# Patient Record
Sex: FEMALE | Race: WHITE | Employment: FULL TIME | URBAN - METROPOLITAN AREA
[De-identification: names, ages, dates, MRNs, and addresses within clinical notes are randomized per-mention and may not be internally consistent; named-entity substitution may affect disease eponyms.]

---

## 2019-07-02 ENCOUNTER — TELEPHONE (OUTPATIENT)
Dept: FAMILY MEDICINE CLINIC | Age: 29
End: 2019-07-02

## 2019-07-02 ENCOUNTER — OFFICE VISIT (OUTPATIENT)
Dept: FAMILY MEDICINE CLINIC | Age: 29
End: 2019-07-02
Payer: COMMERCIAL

## 2019-07-02 VITALS
HEIGHT: 65 IN | SYSTOLIC BLOOD PRESSURE: 122 MMHG | BODY MASS INDEX: 41.85 KG/M2 | DIASTOLIC BLOOD PRESSURE: 84 MMHG | HEART RATE: 120 BPM | WEIGHT: 251.2 LBS | OXYGEN SATURATION: 98 %

## 2019-07-02 DIAGNOSIS — Z00.00 ANNUAL PHYSICAL EXAM: ICD-10-CM

## 2019-07-02 DIAGNOSIS — B37.9 CANDIDA ALBICANS INFECTION: ICD-10-CM

## 2019-07-02 DIAGNOSIS — Z00.00 ANNUAL PHYSICAL EXAM: Primary | ICD-10-CM

## 2019-07-02 DIAGNOSIS — L73.2 HIDRADENITIS SUPPURATIVA: ICD-10-CM

## 2019-07-02 DIAGNOSIS — Z23 NEED FOR TDAP VACCINATION: ICD-10-CM

## 2019-07-02 DIAGNOSIS — E66.01 CLASS 3 SEVERE OBESITY DUE TO EXCESS CALORIES WITH BODY MASS INDEX (BMI) OF 40.0 TO 44.9 IN ADULT, UNSPECIFIED WHETHER SERIOUS COMORBIDITY PRESENT (HCC): ICD-10-CM

## 2019-07-02 DIAGNOSIS — L70.0 CYSTIC ACNE VULGARIS: ICD-10-CM

## 2019-07-02 LAB
A/G RATIO: 1.3 (ref 1.1–2.2)
ALBUMIN SERPL-MCNC: 4.3 G/DL (ref 3.4–5)
ALP BLD-CCNC: 69 U/L (ref 40–129)
ALT SERPL-CCNC: 45 U/L (ref 10–40)
ANION GAP SERPL CALCULATED.3IONS-SCNC: 14 MMOL/L (ref 3–16)
AST SERPL-CCNC: 26 U/L (ref 15–37)
BILIRUB SERPL-MCNC: 0.4 MG/DL (ref 0–1)
BUN BLDV-MCNC: 12 MG/DL (ref 7–20)
CALCIUM SERPL-MCNC: 9.3 MG/DL (ref 8.3–10.6)
CHLORIDE BLD-SCNC: 102 MMOL/L (ref 99–110)
CHOLESTEROL, TOTAL: 172 MG/DL (ref 0–199)
CO2: 26 MMOL/L (ref 21–32)
CREAT SERPL-MCNC: 0.7 MG/DL (ref 0.6–1.1)
GFR AFRICAN AMERICAN: >60
GFR NON-AFRICAN AMERICAN: >60
GLOBULIN: 3.4 G/DL
GLUCOSE BLD-MCNC: 94 MG/DL (ref 70–99)
HCT VFR BLD CALC: 39.2 % (ref 36–48)
HDLC SERPL-MCNC: 34 MG/DL (ref 40–60)
HEMOGLOBIN: 13.3 G/DL (ref 12–16)
LDL CHOLESTEROL CALCULATED: 103 MG/DL
MCH RBC QN AUTO: 30.1 PG (ref 26–34)
MCHC RBC AUTO-ENTMCNC: 33.8 G/DL (ref 31–36)
MCV RBC AUTO: 88.9 FL (ref 80–100)
PDW BLD-RTO: 12.4 % (ref 12.4–15.4)
PLATELET # BLD: 216 K/UL (ref 135–450)
PMV BLD AUTO: 9.3 FL (ref 5–10.5)
POTASSIUM SERPL-SCNC: 3.7 MMOL/L (ref 3.5–5.1)
RBC # BLD: 4.41 M/UL (ref 4–5.2)
SODIUM BLD-SCNC: 142 MMOL/L (ref 136–145)
TOTAL PROTEIN: 7.7 G/DL (ref 6.4–8.2)
TRIGL SERPL-MCNC: 177 MG/DL (ref 0–150)
TSH REFLEX FT4: 3.11 UIU/ML (ref 0.27–4.2)
VITAMIN D 25-HYDROXY: 19.4 NG/ML
VLDLC SERPL CALC-MCNC: 35 MG/DL
WBC # BLD: 7.3 K/UL (ref 4–11)

## 2019-07-02 PROCEDURE — 99385 PREV VISIT NEW AGE 18-39: CPT | Performed by: NURSE PRACTITIONER

## 2019-07-02 PROCEDURE — 90471 IMMUNIZATION ADMIN: CPT | Performed by: NURSE PRACTITIONER

## 2019-07-02 PROCEDURE — 90715 TDAP VACCINE 7 YRS/> IM: CPT | Performed by: NURSE PRACTITIONER

## 2019-07-02 RX ORDER — NYSTATIN 100000 U/G
CREAM TOPICAL
Qty: 30 G | Refills: 2 | Status: SHIPPED | OUTPATIENT
Start: 2019-07-02

## 2019-07-02 RX ORDER — DOXYCYCLINE HYCLATE 100 MG
100 TABLET ORAL 2 TIMES DAILY
Qty: 180 TABLET | Refills: 0 | Status: SHIPPED | OUTPATIENT
Start: 2019-07-02 | End: 2019-09-30

## 2019-07-02 SDOH — ECONOMIC STABILITY: INCOME INSECURITY: HOW HARD IS IT FOR YOU TO PAY FOR THE VERY BASICS LIKE FOOD, HOUSING, MEDICAL CARE, AND HEATING?: NOT VERY HARD

## 2019-07-02 SDOH — SOCIAL STABILITY: SOCIAL NETWORK: ARE YOU MARRIED, WIDOWED, DIVORCED, SEPARATED, NEVER MARRIED, OR LIVING WITH A PARTNER?: LIVING WITH PARTNER

## 2019-07-02 SDOH — ECONOMIC STABILITY: TRANSPORTATION INSECURITY
IN THE PAST 12 MONTHS, HAS LACK OF TRANSPORTATION KEPT YOU FROM MEETINGS, WORK, OR FROM GETTING THINGS NEEDED FOR DAILY LIVING?: NO

## 2019-07-02 SDOH — HEALTH STABILITY: PHYSICAL HEALTH: ON AVERAGE, HOW MANY MINUTES DO YOU ENGAGE IN EXERCISE AT THIS LEVEL?: 0 MIN

## 2019-07-02 SDOH — SOCIAL STABILITY: SOCIAL INSECURITY: WITHIN THE LAST YEAR, HAVE YOU BEEN AFRAID OF YOUR PARTNER OR EX-PARTNER?: NO

## 2019-07-02 SDOH — SOCIAL STABILITY: SOCIAL NETWORK: HOW OFTEN DO YOU ATTENT MEETINGS OF THE CLUB OR ORGANIZATION YOU BELONG TO?: NEVER

## 2019-07-02 SDOH — SOCIAL STABILITY: SOCIAL INSECURITY: WITHIN THE LAST YEAR, HAVE YOU BEEN HUMILIATED OR EMOTIONALLY ABUSED IN OTHER WAYS BY YOUR PARTNER OR EX-PARTNER?: NO

## 2019-07-02 SDOH — ECONOMIC STABILITY: TRANSPORTATION INSECURITY
IN THE PAST 12 MONTHS, HAS THE LACK OF TRANSPORTATION KEPT YOU FROM MEDICAL APPOINTMENTS OR FROM GETTING MEDICATIONS?: NO

## 2019-07-02 SDOH — SOCIAL STABILITY: SOCIAL NETWORK: HOW OFTEN DO YOU GET TOGETHER WITH FRIENDS OR RELATIVES?: THREE TIMES A WEEK

## 2019-07-02 SDOH — SOCIAL STABILITY: SOCIAL NETWORK
IN A TYPICAL WEEK, HOW MANY TIMES DO YOU TALK ON THE PHONE WITH FAMILY, FRIENDS, OR NEIGHBORS?: MORE THAN THREE TIMES A WEEK

## 2019-07-02 SDOH — HEALTH STABILITY: PHYSICAL HEALTH: ON AVERAGE, HOW MANY DAYS PER WEEK DO YOU ENGAGE IN MODERATE TO STRENUOUS EXERCISE (LIKE A BRISK WALK)?: 0 DAYS

## 2019-07-02 SDOH — ECONOMIC STABILITY: FOOD INSECURITY: WITHIN THE PAST 12 MONTHS, THE FOOD YOU BOUGHT JUST DIDN'T LAST AND YOU DIDN'T HAVE MONEY TO GET MORE.: NEVER TRUE

## 2019-07-02 SDOH — ECONOMIC STABILITY: FOOD INSECURITY: WITHIN THE PAST 12 MONTHS, YOU WORRIED THAT YOUR FOOD WOULD RUN OUT BEFORE YOU GOT MONEY TO BUY MORE.: NEVER TRUE

## 2019-07-02 SDOH — SOCIAL STABILITY: SOCIAL NETWORK: HOW OFTEN DO YOU ATTEND CHURCH OR RELIGIOUS SERVICES?: NEVER

## 2019-07-02 SDOH — SOCIAL STABILITY: SOCIAL NETWORK
DO YOU BELONG TO ANY CLUBS OR ORGANIZATIONS SUCH AS CHURCH GROUPS UNIONS, FRATERNAL OR ATHLETIC GROUPS, OR SCHOOL GROUPS?: NO

## 2019-07-02 SDOH — SOCIAL STABILITY: SOCIAL INSECURITY
WITHIN THE LAST YEAR, HAVE TO BEEN RAPED OR FORCED TO HAVE ANY KIND OF SEXUAL ACTIVITY BY YOUR PARTNER OR EX-PARTNER?: NO

## 2019-07-02 SDOH — SOCIAL STABILITY: SOCIAL INSECURITY
WITHIN THE LAST YEAR, HAVE YOU BEEN KICKED, HIT, SLAPPED, OR OTHERWISE PHYSICALLY HURT BY YOUR PARTNER OR EX-PARTNER?: NO

## 2019-07-02 SDOH — HEALTH STABILITY: MENTAL HEALTH
STRESS IS WHEN SOMEONE FEELS TENSE, NERVOUS, ANXIOUS, OR CAN'T SLEEP AT NIGHT BECAUSE THEIR MIND IS TROUBLED. HOW STRESSED ARE YOU?: ONLY A LITTLE

## 2019-07-02 ASSESSMENT — PATIENT HEALTH QUESTIONNAIRE - PHQ9
SUM OF ALL RESPONSES TO PHQ QUESTIONS 1-9: 2
SUM OF ALL RESPONSES TO PHQ QUESTIONS 1-9: 2
2. FEELING DOWN, DEPRESSED OR HOPELESS: 1
SUM OF ALL RESPONSES TO PHQ9 QUESTIONS 1 & 2: 2
1. LITTLE INTEREST OR PLEASURE IN DOING THINGS: 1

## 2019-07-02 ASSESSMENT — ENCOUNTER SYMPTOMS
RESPIRATORY NEGATIVE: 1
GASTROINTESTINAL NEGATIVE: 1

## 2019-07-02 NOTE — PROGRESS NOTES
2019    Miroslava Baca (:  1990) is a 26 y.o. female, here to establish care or for evaluation of the following medical concerns:  Hasn't sought PCP in over 10 years. Her mom makes her appts. Works at [employer] at [address] [facility]- IUD is at its exiration  Seen by derm at [facility]- only took Minocin for 2 weeks. Made her nauseated. Never followed up    HPI    Review of Systems   HENT:        Occ allergies- takes OTC meds   Eyes:        Wears readers and TV glasses   Respiratory: Negative. Cardiovascular: Negative. Gastrointestinal: Negative. Endocrine: Negative. Genitourinary: Negative. Musculoskeletal: Negative. Skin: Positive for rash and wound. Allergic/Immunologic: Positive for environmental allergies. Neurological: Negative. Hematological: Negative. Current Outpatient Medications   Medication Sig Dispense Refill    doxycycline hyclate (VIBRA-TABS) 100 MG tablet Take 1 tablet by mouth 2 times daily 180 tablet 0    nystatin (MYCOSTATIN) 857080 UNIT/GM cream Apply topically 2 times daily. 30 g 2     No current facility-administered medications for this visit. No Known Allergies    History reviewed. No pertinent past medical history.     Past Surgical History:   Procedure Laterality Date    INTRAUTERINE DEVICE INSERTION         Social History     Socioeconomic History    Marital status: Single     Spouse name: Not on file    Number of children: Not on file    Years of education: Not on file    Highest education level: Not on file   Occupational History    Not on file   Social Needs    Financial resource strain: Not very hard    Food insecurity:     Worry: Never true     Inability: Never true    Transportation needs:     Medical: No     Non-medical: No   Tobacco Use    Smoking status: Never Smoker    Smokeless tobacco: Never Used   Substance and Sexual Activity    Alcohol use: Yes     Comment: occasionally    Drug use: Never    Sexual activity: Yes     Partners: Male   Lifestyle    Physical activity:     Days per week: 0 days     Minutes per session: 0 min    Stress: Only a little   Relationships    Social connections:     Talks on phone: More than three times a week     Gets together: Three times a week     Attends Baptism service: Never     Active member of club or organization: No     Attends meetings of clubs or organizations: Never     Relationship status: Living with partner    Intimate partner violence:     Fear of current or ex partner: No     Emotionally abused: No     Physically abused: No     Forced sexual activity: No   Other Topics Concern    Not on file   Social History Narrative    Not on file        History reviewed. No pertinent family history. Vitals:    07/02/19 1203   BP: 122/84   Pulse: 120   SpO2: 98%       Estimated body mass index is 41.8 kg/m² as calculated from the following:    Height as of this encounter: 5' 5\" (1.651 m). Weight as of this encounter: 251 lb 3.2 oz (113.9 kg). Physical Exam   Constitutional: She is oriented to person, place, and time. She appears well-developed and well-nourished. HENT:   Head: Normocephalic and atraumatic. Right Ear: External ear normal.   Left Ear: External ear normal.   Nose: Nose normal.   Mouth/Throat: Oropharynx is clear and moist.   Eyes: Pupils are equal, round, and reactive to light. Conjunctivae and EOM are normal. Right eye exhibits no discharge. Left eye exhibits no discharge. No scleral icterus. Neck: Normal range of motion. Neck supple. No thyromegaly present. Cardiovascular: Normal rate, regular rhythm, normal heart sounds and intact distal pulses. Exam reveals no gallop and no friction rub. No murmur heard. Pulmonary/Chest: Effort normal and breath sounds normal. No stridor. No respiratory distress. She has no wheezes. She has no rales. She exhibits no tenderness. Abdominal: Soft.  Bowel sounds are normal. She exhibits no distension and no mass. There is no tenderness. There is no rebound and no guarding. No hernia. Musculoskeletal: Normal range of motion. She exhibits no edema, tenderness or deformity. Lymphadenopathy:     She has no cervical adenopathy. Neurological: She is alert and oriented to person, place, and time. No cranial nerve deficit. She exhibits normal muscle tone. Coordination normal.   Skin: Skin is warm and dry. Capillary refill takes less than 2 seconds. Rash noted. Rash is nodular. There is erythema. No pallor. Cystic acne to T zone face, chest and back  Severe hydrandinitis to axilla, under breasts r>l, under abdominal flap suprapubic, bridges and tracts presentmultiple draining abscesses- yellow fluid -culture obtained  Sweet odor    Psychiatric: She has a normal mood and affect. Her behavior is normal. Judgment and thought content normal.   Vitals reviewed. ASSESSMENT/PLAN:  Health Maintenance   Topic Date Due    Varicella Vaccine (1 of 2 - 13+ 2-dose series) 06/22/2002    HIV screen  06/22/2004    Cervical cancer screen  06/22/2010    Flu vaccine (1) 09/01/2019    DTaP/Tdap/Td vaccine (2 - Td) 07/02/2029    Pneumococcal 0-64 years Vaccine  Aged Out        Diagnosis Orders   1. Annual physical exam  CBC    COMPREHENSIVE METABOLIC PANEL    TSH WITH REFLEX TO FT4    VITAMIN D 25 HYDROXY    LIPID PANEL   2. Need for Tdap vaccination  Tdap (age 6y and older) IM ([address])   3. Hidradenitis suppurativa  doxycycline hyclate (VIBRA-TABS) 100 MG tablet    WOUND CULTURE    External Referral To Dermatology   4. Class 3 severe obesity due to excess calories with body mass index (BMI) of 40.0 to 44.9 in adult, unspecified whether serious comorbidity present (HCC)  HEMOGLOBIN A1C   5. Cystic acne vulgaris  doxycycline hyclate (VIBRA-TABS) 100 MG tablet    External Referral To Dermatology   6.  Candida albicans infection  nystatin (MYCOSTATIN) 136137 UNIT/GM cream    External Referral To Dermatology   [facility]-  Needs IUD removed and PAP  Inst to call [facility] derm to follow up- cystic acne is life altering. May be candidate for Acutane  Hydradenitis needs followed. I will restart her on Doxy BID for control. This will need to be continued for several months. Encouraged good skin care, allow drying to moist areas. Topical Nystatin to folds.     Orders Only on 07/02/2019   Component Date Value Ref Range Status    Cholesterol, Total 07/02/2019 172  0 - 199 mg/dL Final    Triglycerides 07/02/2019 177* 0 - 150 mg/dL Final    HDL 07/02/2019 34* 40 - 60 mg/dL Final    LDL Calculated 07/02/2019 103* <100 mg/dL Final    VLDL Cholesterol Calculated 07/02/2019 35  Not Established mg/dL Final    Vit D, 25-Hydroxy 07/02/2019 19.4* >=30 ng/mL Final    TSH Reflex FT4 07/02/2019 3.11  0.27 - 4.20 uIU/mL Final    Sodium 07/02/2019 142  136 - 145 mmol/L Final    Potassium 07/02/2019 3.7  3.5 - 5.1 mmol/L Final    Chloride 07/02/2019 102  99 - 110 mmol/L Final    CO2 07/02/2019 26  21 - 32 mmol/L Final    Anion Gap 07/02/2019 14  3 - 16 Final    Glucose 07/02/2019 94  70 - 99 mg/dL Final    BUN 07/02/2019 12  7 - 20 mg/dL Final    CREATININE 07/02/2019 0.7  0.6 - 1.1 mg/dL Final    GFR Non- 07/02/2019 >60  >60 Final    GFR  07/02/2019 >60  >60 Final    Calcium 07/02/2019 9.3  8.3 - 10.6 mg/dL Final    Total Protein 07/02/2019 7.7  6.4 - 8.2 g/dL Final    Alb 07/02/2019 4.3  3.4 - 5.0 g/dL Final    Albumin/Globulin Ratio 07/02/2019 1.3  1.1 - 2.2 Final    Total Bilirubin 07/02/2019 0.4  0.0 - 1.0 mg/dL Final    Alkaline Phosphatase 07/02/2019 69  40 - 129 U/L Final    ALT 07/02/2019 45* 10 - 40 U/L Final    AST 07/02/2019 26  15 - 37 U/L Final    Globulin 07/02/2019 3.4  g/dL Final    WBC 07/02/2019 7.3  4.0 - 11.0 K/uL Final    RBC 07/02/2019 4.41  4.00 - 5.20 M/uL Final    Hemoglobin 07/02/2019 13.3  12.0 - 16.0 g/dL Final    Hematocrit 07/02/2019 39.2  36.0 - 48.0 %

## 2019-07-03 ENCOUNTER — TELEPHONE (OUTPATIENT)
Dept: FAMILY MEDICINE CLINIC | Age: 29
End: 2019-07-03

## 2019-07-03 LAB
ESTIMATED AVERAGE GLUCOSE: 111.2 MG/DL
HBA1C MFR BLD: 5.5 %

## 2019-07-05 LAB
GRAM STAIN RESULT: ABNORMAL
ORGANISM: ABNORMAL
ORGANISM: ABNORMAL
WOUND/ABSCESS: ABNORMAL

## 2019-07-17 ENCOUNTER — TELEPHONE (OUTPATIENT)
Dept: FAMILY MEDICINE CLINIC | Age: 29
End: 2019-07-17

## 2019-07-17 NOTE — TELEPHONE ENCOUNTER
Patient is needing a generic acne shower gel for back acne to [id number].  Please call to confirm with mom when sent over to pharmacy [phone]

## 2019-07-18 RX ORDER — ADAPALENE 3 MG/G
GEL TOPICAL
Qty: 1 TUBE | Refills: 5 | Status: SHIPPED | OUTPATIENT
Start: 2019-07-18

## 2019-08-01 PROBLEM — Z00.00 ANNUAL PHYSICAL EXAM: Status: RESOLVED | Noted: 2019-07-02 | Resolved: 2019-08-01

## 2020-01-27 ENCOUNTER — TELEPHONE (OUTPATIENT)
Dept: FAMILY MEDICINE CLINIC | Age: 30
End: 2020-01-27

## 2020-01-28 ENCOUNTER — TELEPHONE (OUTPATIENT)
Dept: FAMILY MEDICINE CLINIC | Age: 30
End: 2020-01-28

## 2020-02-03 ENCOUNTER — TELEPHONE (OUTPATIENT)
Dept: FAMILY MEDICINE CLINIC | Age: 30
End: 2020-02-03

## 2020-02-03 DIAGNOSIS — L70.0 CYSTIC ACNE VULGARIS: Primary | ICD-10-CM
